# Patient Record
Sex: FEMALE | Race: BLACK OR AFRICAN AMERICAN | NOT HISPANIC OR LATINO | Employment: UNEMPLOYED | URBAN - METROPOLITAN AREA
[De-identification: names, ages, dates, MRNs, and addresses within clinical notes are randomized per-mention and may not be internally consistent; named-entity substitution may affect disease eponyms.]

---

## 2023-11-06 ENCOUNTER — HOSPITAL ENCOUNTER (EMERGENCY)
Facility: HOSPITAL | Age: 28
Discharge: HOME | End: 2023-11-06
Payer: MEDICARE

## 2023-11-06 ENCOUNTER — APPOINTMENT (OUTPATIENT)
Dept: RADIOLOGY | Facility: HOSPITAL | Age: 28
End: 2023-11-06
Payer: MEDICARE

## 2023-11-06 ENCOUNTER — PHARMACY VISIT (OUTPATIENT)
Dept: PHARMACY | Facility: CLINIC | Age: 28
End: 2023-11-06
Payer: COMMERCIAL

## 2023-11-06 VITALS
OXYGEN SATURATION: 99 % | RESPIRATION RATE: 17 BRPM | HEART RATE: 100 BPM | HEIGHT: 59 IN | BODY MASS INDEX: 22.18 KG/M2 | WEIGHT: 110 LBS | DIASTOLIC BLOOD PRESSURE: 77 MMHG | TEMPERATURE: 98.4 F | SYSTOLIC BLOOD PRESSURE: 119 MMHG

## 2023-11-06 DIAGNOSIS — M25.512 ACUTE PAIN OF LEFT SHOULDER: Primary | ICD-10-CM

## 2023-11-06 PROCEDURE — 2500000004 HC RX 250 GENERAL PHARMACY W/ HCPCS (ALT 636 FOR OP/ED)

## 2023-11-06 PROCEDURE — 99283 EMERGENCY DEPT VISIT LOW MDM: CPT | Mod: 25

## 2023-11-06 PROCEDURE — 99285 EMERGENCY DEPT VISIT HI MDM: CPT | Mod: 25

## 2023-11-06 PROCEDURE — 73030 X-RAY EXAM OF SHOULDER: CPT | Mod: LEFT SIDE | Performed by: RADIOLOGY

## 2023-11-06 PROCEDURE — 73030 X-RAY EXAM OF SHOULDER: CPT | Mod: LT,FR

## 2023-11-06 PROCEDURE — 96372 THER/PROPH/DIAG INJ SC/IM: CPT

## 2023-11-06 RX ORDER — NAPROXEN 500 MG/1
500 TABLET ORAL
Qty: 20 TABLET | Refills: 0 | Status: SHIPPED | OUTPATIENT
Start: 2023-11-06 | End: 2023-11-06 | Stop reason: SDUPTHER

## 2023-11-06 RX ORDER — KETOROLAC TROMETHAMINE 30 MG/ML
15 INJECTION, SOLUTION INTRAMUSCULAR; INTRAVENOUS ONCE
Status: COMPLETED | OUTPATIENT
Start: 2023-11-06 | End: 2023-11-06

## 2023-11-06 RX ORDER — NAPROXEN 500 MG/1
500 TABLET ORAL
Qty: 20 TABLET | Refills: 0 | Status: SHIPPED | OUTPATIENT
Start: 2023-11-06 | End: 2023-11-16

## 2023-11-06 RX ADMIN — KETOROLAC TROMETHAMINE 15 MG: 30 INJECTION, SOLUTION INTRAMUSCULAR at 17:37

## 2023-11-06 ASSESSMENT — PAIN DESCRIPTION - ORIENTATION: ORIENTATION: LEFT

## 2023-11-06 ASSESSMENT — PAIN DESCRIPTION - LOCATION: LOCATION: SHOULDER

## 2023-11-06 ASSESSMENT — PAIN - FUNCTIONAL ASSESSMENT: PAIN_FUNCTIONAL_ASSESSMENT: 0-10

## 2023-11-06 ASSESSMENT — PAIN SCALES - GENERAL: PAINLEVEL_OUTOF10: 7

## 2023-11-06 ASSESSMENT — PAIN DESCRIPTION - DESCRIPTORS: DESCRIPTORS: SHOOTING;TENDER

## 2023-11-06 NOTE — ED TRIAGE NOTES
PT ARRIVED TO TRIAGE FOR LEFT SHOULDER PAIN. PT STS THE PAIN HAS BEEN THERE SINCE YESTERDAY. PT DENIES ANY INJURY TO THE PAIN. PT HAS DIFFICULTIES RAISING LEFT ARM DUE TO PAIN. VSS IN TRIAGE. NO OBVIOUS SIGNS OFR DISTRESS NOTED.

## 2023-11-06 NOTE — ED PROVIDER NOTES
HPI   Chief Complaint   Patient presents with    Shoulder Pain       HPI  HPI: This is a 28 year old female who presents to the ER complaining of left shoulder pain for the past 3 days.  Patient states that the night for pain onset she was lifting and carrying her sisters child which she thinks may have incited the pain, the following day she was getting dressed and she felt a pop in her shoulder, and has had constant pain in the shoulder since then.  She reports she has difficulty flexing her bicep and pain with lifting the arm.  She states that the upper arm feels puffy.  She denies any pain in the chest, axilla, back, or neck.  She denies numbness or tingling of the arm.  Has no chest pain or shortness of breath, no palpitations.  No dizziness or lightheadedness, no nausea or vomiting, no diaphoresis.  No lower extremity edema or pain.  No other complaints or symptoms voiced.    ROS:  General: No decreased responsiveness, no fever, chills  Neuro: no numbness or tingling  ENMT: No nosebleed  Eyes: No discharge or redness  Skel: + left shoulder pain, no neck or back pain  Cardiac: No chest pain   Resp: No shortness of breath  GI: No abdominal pain  Skin: no rash or wounds, no erythema, edema or ecchymosis  Heme: no bleeding or petechiae      PMH: denies  Social History: no smoker, no EtOH, no drugs  Family History: Noncontributory      Physical Exam:  General: Vital signs stable, Pt is alert, no acute distress  Eyes: Conjunctiva normal, EOMs intact  HENMT: Normocephalic, atraumatic.  Moist mucous membranes.   Resp: Respiratory effort is normal, no retractions, no stridor.   CV: Heart is regular rate and rhythm.   Skin: No evidence of trauma, skin is warm and dry. No rashes, lesions or ulcers.  Skel: full range of motion of right upper and bilateral lower extremities. No midline tenderness over the cervical thoracic or lumbar spine.  LUE: +tenderness over the proximal biceps insertion site on the anterior shoulder,  significant pain with biceps flexion, palpable abnormality in proximal biceps when flexed, however may be partially due to poor effort secondary to pain.  Proximal biceps tendon intact.  Pain with shoulder forward flexion, abduction, and external rotation.  There is no significant edema about the shoulder or the entire LUE.  There is no warmth or erythema, no evidence of cellulitis or septic joint.  No wounds, bruising, or bleeding.  No pain over the elbow, forearm, wrist, hand, or digits.  No tenderness over the chest wall, back, neck, or trapezius.  Upper extremities are neurovascularly intact distally, 2+ radial pulses, capillary refill less than 2 seconds, warm and well-perfused, sensation intact and equal throughout the BUE.  Neuro: Normal gait, no motor or sensory changes.  Psych: Alert and oriented ×3, judgment is appropriate, normal mood and affect           [place] Coma Scale Score: 15                  Patient History   Past Medical History:   Diagnosis Date    Personal history of other diseases of the respiratory system     History of asthma     No past surgical history on file.  No family history on file.  Social History     Tobacco Use    Smoking status: Not on file    Smokeless tobacco: Not on file   Substance Use Topics    Alcohol use: Not on file    Drug use: Not on file       Physical Exam   ED Triage Vitals [11/06/23 1408]   Temp Heart Rate Resp BP   36.9 °C (98.4 °F) 100 17 119/77      SpO2 Temp Source Heart Rate Source Patient Position   99 % Oral -- Sitting      BP Location FiO2 (%)     Right arm --       Physical Exam    ED Course & MDM   Diagnoses as of 11/06/23 1714   Acute pain of left shoulder       Medical Decision Making  ED course / MDM     Summary:  Patient presented with left shoulder pain for the past 2 to 3 days.  Felt a pop when she was getting dressed.  Vital signs are stable, patient appears nontoxic.  She has no cardiac symptoms, no chest pain, no shortness of breath or  palpitations, no nausea or diaphoresis, no dizziness lightheadedness, no cardiovascular risk factors, feel this is unlikely related to a cardiac etiology.  On exam, there is tenderness at the proximal biceps insertion, she has significant pain with biceps flexion, there appears to be a palpable difference when flex, though may be in part due to poor effort secondary to pain.  Tenderness with forward flexion, external rotation, and abduction as well.  Neurovascularly intact, warm well perfused, no motor or sensory deficit.  2+ radial pulses equal and getting bilaterally.  There is no edema, warmth, or erythema noted, no evidence of cellulitis or septic joint.  X-ray shows no acute fractures or dislocations. Results and differential were discussed in detail with the patient.  She may have a rotator cuff or bicep strain, however there is concern for a proximal biceps tendon rupture.  Offered to call a formal consult the ED today, patient prefers to be discharged and report to the orthopedic injury clinic first thing tomorrow morning.  This is reasonable as she is well-appearing, and the extremity is neurovascularly intact.  Given a sling and a prescription for naproxen, as well as a dose of Toradol in the ED. Patient was given strict return precautions, understands reasons to return to the ED. Also discussed supportive care instructions. I expressed the importance of outpatient follow up with their PCP. All questions were answered, patient expressed understanding and stated that they would comply.    Patient was advised to follow up with PCP or recommended provider in 2-3 days for another evaluation and exam. I advised patient and family/friend/caregiver/guardian to return or go to closest emergency room immediately if symptoms change, get worse, new symptoms develop prior to follow up. If there is no improvement in symptoms in the next 24 hours they are advised to return for further evaluation and exam. I also  explained the plan and treatment course. Patient and family/friend/caregiver/guardian is in agreement with plan, treatment course, and follow up and states verbally that they will comply.    Impression:  1. See diagnosis    Plan: Homegoing. I discussed the differential, results, and discharge plan with the patient and family/friend/caregiver. I emphasized the importance of follow-up with the physician I referred them to in the timeframe recommended.  I explained reasons for the patient to return to the Emergency Department. They agreed that if they feel their condition is worsening or if they have any other concern they should call 911 immediately for further assistance. We also discussed medications that were prescribed including common side effects and interactions. The patient was advised to abstain from driving, operating heavy machinery, or making significant decisions while taking medications such as opiates and muscle relaxers that may impair this. I gave the patient an opportunity to ask all questions they had and answered all of them accordingly. They understand return precautions and discharge instructions. The patient and family/friend/caregiver expressed understanding verbally and that they would comply.       Disposition: Discharge    This note has been transcribed using voice recognition and may contain grammatical errors, misplaced words, incorrect words, incorrect phrases or other errors.     Procedure  Procedures     Alondra Persaud PA-C  11/06/23 0729

## 2023-11-06 NOTE — DISCHARGE INSTRUCTIONS
Go to the orthopedic injury clinic ASAP tomorrow  Concern you may have torn a muscle or ligament in your shoulder

## 2023-11-07 ENCOUNTER — OFFICE VISIT (OUTPATIENT)
Dept: ORTHOPEDIC SURGERY | Facility: HOSPITAL | Age: 28
End: 2023-11-07
Payer: MEDICARE

## 2023-11-07 ENCOUNTER — PHARMACY VISIT (OUTPATIENT)
Dept: PHARMACY | Facility: CLINIC | Age: 28
End: 2023-11-07
Payer: COMMERCIAL

## 2023-11-07 VITALS — BODY MASS INDEX: 22.78 KG/M2 | HEIGHT: 59 IN | WEIGHT: 113 LBS

## 2023-11-07 DIAGNOSIS — M75.21 BICEPS TENDONITIS, RIGHT: Primary | ICD-10-CM

## 2023-11-07 PROCEDURE — 1036F TOBACCO NON-USER: CPT | Performed by: STUDENT IN AN ORGANIZED HEALTH CARE EDUCATION/TRAINING PROGRAM

## 2023-11-07 PROCEDURE — 99214 OFFICE O/P EST MOD 30 MIN: CPT | Mod: GC | Performed by: STUDENT IN AN ORGANIZED HEALTH CARE EDUCATION/TRAINING PROGRAM

## 2023-11-07 PROCEDURE — 99204 OFFICE O/P NEW MOD 45 MIN: CPT | Performed by: STUDENT IN AN ORGANIZED HEALTH CARE EDUCATION/TRAINING PROGRAM

## 2023-11-07 RX ORDER — MELOXICAM 15 MG/1
15 TABLET ORAL DAILY
Qty: 30 TABLET | Refills: 0 | Status: SHIPPED | OUTPATIENT
Start: 2023-11-07 | End: 2023-11-08

## 2023-11-07 ASSESSMENT — ENCOUNTER SYMPTOMS
FEVER: 0
JOINT SWELLING: 0
FATIGUE: 0
ARTHRALGIAS: 1
BACK PAIN: 0
COLOR CHANGE: 0
NUMBNESS: 0
WEAKNESS: 0
ACTIVITY CHANGE: 0

## 2023-11-07 ASSESSMENT — PAIN SCALES - GENERAL: PAINLEVEL_OUTOF10: 7

## 2023-11-07 ASSESSMENT — PAIN - FUNCTIONAL ASSESSMENT: PAIN_FUNCTIONAL_ASSESSMENT: 0-10

## 2023-11-07 ASSESSMENT — PAIN DESCRIPTION - DESCRIPTORS: DESCRIPTORS: ACHING;THROBBING

## 2023-11-07 NOTE — PROGRESS NOTES
REFERRAL SOURCE: No ref. provider found     CHIEF COMPLAINT: left shoulder pain . - orthopedic injury clinic evaluation     HISTORY OF PRESENT ILLNESS  Laurita Landis is a very pleasant 28 y.o. 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     11/7/23: She started to have pain in her left shoulder that started on November 5, 2023 with no apparent injury or specific movement.  Only thing she can think of was on Friday, November 3, 2023 she was lifting her godchild up out of bed with both arms and was carrying her around for a long time.  Since Sunday the pain is gotten worse and can get up to 8 out of 10 intensity.  She describes it as sharp and throbbing and comes and goes with specific movements including arm up overhead and turning her palm up towards the ceiling.  She tried Tylenol with no relief.  She went to the emergency room got tramadol and naproxen which does help with the pain but not the inflammation that she feels in her shoulder.  Pain radiates down her bicep towards her elbow but denies any numbness or tingling in her arm.  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     MEDS    Current Outpatient Medications:     lidocaine HCL 4 % adhesive patch,medicated, Apply 1 Units topically 2 times a day for 5 days., Disp: 10 patch, Rfl: 0    naproxen (Naprosyn) 500 mg tablet, Take 1 tablet (500 mg) by mouth 2 times a day with meals as needed for pain for up to 10 days, Disp: 20 tablet, Rfl: 0  No current facility-administered medications for this visit.    ALLERGIES  No Known Allergies    PAST MEDICAL HISTORY  Past Medical History:   Diagnosis Date    Personal history of other diseases of the respiratory system     History of asthma       PAST SURGICAL HISTORY  No past surgical history on file.    SOCIAL HISTORY   Social History      Socioeconomic History    Marital status: Single     Spouse name: Not on file    Number of children: Not on file    Years of education: Not on file    Highest education level: Not on file   Occupational History    Not on file   Tobacco Use    Smoking status: Never    Smokeless tobacco: Never   Substance and Sexual Activity    Alcohol use: Yes     Alcohol/week: 1.0 standard drink of alcohol     Types: 1 Glasses of wine per week    Drug use: Yes     Types: Marijuana    Sexual activity: Not on file   Other Topics Concern    Not on file   Social History Narrative    Not on file     Social Determinants of Health     Financial Resource Strain: Not on file   Food Insecurity: Not on file   Transportation Needs: Not on file   Physical Activity: Not on file   Stress: Not on file   Social Connections: Not on file   Intimate Partner Violence: Not on file   Housing Stability: Not on file       FAMILY HISTORY  No family history on file.    REVIEW OF SYSTEMS  Except for those mentioned in the history of present illness, and below, a complete review of systems is negative.     Review of Systems   Constitutional:  Negative for activity change, fatigue and fever.   Musculoskeletal:  Positive for arthralgias (lef shoulder pain). Negative for back pain and joint swelling.   Skin:  Negative for color change and rash.   Neurological:  Negative for weakness and numbness.       VITALS  There were no vitals filed for this visit.    PHYSICAL EXAMINATION   GENERAL:  Awake, alert, and oriented, no apparent distress, pleasant, and cooperative  PSYC: Mood is euthymic, affect is congruent  EAR, NOSE, THROAT:  Normocephalic, atraumatic, moist membranes, anicteric sclera  LUNG: Nonlabored breathing  HEART: No clubbing or cyanosis  SKIN: No increased erythema, warmth, rashes, or concerning skin lesions  NEURO: Sensation is intact in the bilateral upper extremities. Strength is grossly 5 out of 5 throughout the bilateral upper extremities, unless  noted below.  GAIT: Non-antalgic.  MUSCULOSKELETAL: Examination of the left shoulder:  Active ROM is full but feels pain with end range of flexion and abduction  Passive ROM is full.  No scapulohumeral dyskinesis.  No appreciable muscle atrophy.  Tenderness to palpation over the bicipital groove.  Full can/Empty can is negative.  Speed's is positive  Cadena's is negative.  Yergason's is positive  Scarf is negative  Hawkin's is negative.  Neer's is negative.    IMAGING STUDIES:   Radiographs of the left shoulder on November 7, 2023 show no fracture and good alignment of the glenohumeral joint. I personally reviewed and interpreted these images and shared the findings with the patient.     IMPRESSION  #1 left biceps tendonitis    PLAN  The following was discussed with the patient:    Laurita Landis is a very pleasant 28 y.o. 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  Her history and physical exam seem most consistent with left biceps tendonitis.  -We discussed the above diagnosis and discussed that she should be using and moving the left arm to help this improve.  -We discussed that physical therapy is the mainstay of treatment and she is given a referral today.  -She was given prescription for meloxicam 15 mg which she should take daily while on her trip to [place].  -She can continue to take Tylenol while taking the meloxicam, but should avoid NSAIDs.  -Follow up in 6 weeks after she returns from her work trip in [place].    The patient was counseled to remain active, but avoid activities that worsen symptoms. The patient was in agreement with this plan. All questions were answered to the best of my ability.    PATIENT EDUCATION:  Education was discussed at today's appointment. A learning needs assessment was performed.    Primary learner: Laurita Landis  Barriers to learning: None  Preferred  language: English  Learning preferences include: Seeing and doing.  Discussed: Diagnosis and treatment plan.  Demonstrated: Understanding of material discussed.  Patient education materials given: None.  Learner response: Learner demonstrated understanding.    This note was dictated using Dragon speech recognition software and was not corrected for spelling or grammatical errors.     Patient seen and examined with PM&R resident, Dr. Young. History, physical examination, pertinent imaging findings and the plan of care were discussed and I performed the key portions of the history, physical examination, and discussion of the plan of care. I have edited his note and agree with the findings.      Tonya Rooney MD    [facility]   and [facility]

## 2023-11-08 RX ORDER — MELOXICAM 15 MG/1
15 TABLET ORAL DAILY
Qty: 30 TABLET | Refills: 0 | Status: SHIPPED | OUTPATIENT
Start: 2023-11-08 | End: 2023-12-08

## 2023-12-06 PROBLEM — M25.521 RIGHT ELBOW PAIN: Status: ACTIVE | Noted: 2023-12-06

## 2023-12-06 NOTE — PROGRESS NOTES
Physical Therapy  Physical Therapy Orthopedic Evaluation    Patient Name: Laurita Landis  MRN: [id number]  Today's Date: 12/7/2023  Referred By: Nevin  [place] [facility] [place] [facility] [place]0 [place]  Visit Limit: ?  Visit: 1    Time Calculation  Start Time: 0840  Stop Time: 0920  Time Calculation (min): 40 min    Current Problem  1. Right elbow pain  Follow Up In Physical Therapy      2. Biceps tendonitis, right  Referral to Physical Therapy           Precautions: No fall risk       Subjective:   Subjective   Chief Complaint: R elbow pain  Onset: 11/1/23  NO: insidious    Pt reports to session with chief complaints of R shoulder, elbow pain that has been ongoing since beginning of November without incident. She says that symptoms insidiously came on and presented to the ED due to the pain. She mentions lifting her godchild up as a possible cause of symptoms but denies feeling any popping sensation. She also denies any associated numbness, tingling with symptoms but describes symptoms as throbbing. Pt states that she has had pinched nerve prior and symptoms do not feel similar. Pt specifically states that symptoms worsen with overhead reaching, lowering of arms to side and dressing. X rays were unremarkable, and pt was diagnosed with biceps tendonitis. She has not had therapy for this complaint. She lives an active lifestyle and is hoping to return to [facility].     Current Condition: same    PAIN  Intensity (0-10): 8/10 current; 10/10 worst  Location: L shoulder   Description: achy, heavy     Aggravating Factors:  movement  Relieving Factors:  rest, otc meds, rx meds    Relevant Information (PMH & Previous Tests/Imaging): x rays  Previous Interventions/Treatments: n/a    Prior Level of Function (PLOF)  Exercise/Physical Activity: independent  Work/School: independent    Red Flags: Do you have any of the following? No  Fever/chills, unexplained weight changes, dizziness/fainting, unexplained change in bowel or bladder functions,  unexplained malaise or muscle weakness, night pain/sweats, numbness or tingling    Objective:  Objective     Observation: rounded shoulders    Shoulder AROM  Flexion  R: 165 deg  L: 145 deg, P!  Abduction  R: 160 deg  L: 135 deg, P!  Functional ER  R: C2  L: C2  Functional IR  R: L5  L: L5    Elbow AROM  Flexion  R: 135 deg  L: 135 deg  Extension  R: 0 deg  L: 0 deg     UE MMT  Shoulder Flexion  R: 4  L: 4  Shoulder Abduction  R: 4  L: 4, P!  Shoulder ER  R: 4  L: 4, P!  Shoulder IR  R: 4  L: 4, P!  Elbow Flexion  R: 4  L: 4, P!  Elbow Extension  R: 4  L: 4, P!    Special Tests:  Empty Can: (+) L  Full can: (+) L  Lift off: (+) L  Hornblower's: (+) L  Uppercut: (+) L  Speed's: (+) L    Outcome Measures:  QuickDASH: 65.91    EDUCATION: home exercise program, plan of care, activity modifications, pain management, and injury pathology       Goals:  Active       PT Problem       PT Goal 1       Start:  12/07/23    Expected End:  01/21/24       In 3 weeks, pt will rate pain 0-4 on the numeric pain scale to allow for restful nights of sleep.  In 3 weeks, pt will be able to lift 5# overhead without symptoms.   In 3 weeks, pt's shoulder AROM will be +10% improved.         PT Goal 2       Start:  12/07/23    Expected End:  03/06/24       In 6 weeks, pt will be able to perform all workplace activities without symptoms.  In 6 weeks, pt will be independent with HEP.  In 6 weeks, pt's QuickDASH will be +6.             Treatments:   Exercise Sets/Reps    Standing Shoulder Row with Anchored Resistance  10   Standing Shoulder Horizontal Abduction with Resistance  10   Shoulder External Rotation and Scapular Retraction with Resistance 10   Access Code: [email or web address]     Equipment Provided: red, green theraband  HEP Provided:    Access Code: [email or web address]  URL: [email or web address]  Date: 12/07/2023  Prepared by: Jefferson Shannon    Exercises  - Standing Shoulder Row with Anchored Resistance  - 2 x daily - 7 x weekly -  2 sets - 10 reps  - Standing Shoulder Horizontal Abduction with Resistance  - 2 x daily - 7 x weekly - 2 sets - 10 reps  - Shoulder External Rotation and Scapular Retraction with Resistance  - 2 x daily - 7 x weekly - 2 sets - 10 reps    Assessment: Pt is a 29 y/o F with signs and symptoms consistent with the referring diagnosis of R biceps tendinitis and possible rotator cuff pathology. Pt displayed lack of elbow range of motion, gross upper extremity strength as well as flexibility, pain and functional ability deficits. Skilled physical therapy is necessary in order to improve aforementioned impairments to allow for increased participation in functional and recreational activities without limitations. Plan of care will consist of upper extremity stretching and strengthening in order to return to PLOF.     Plan: Continue with shoulder and elbow mobility, stability in order to return to recreational and workplace activities without symptoms.     Planned Interventions include: therapeutic exercise, self-care home management, manual therapy, therapeutic activities, gait training, neuromuscular coordination, aquatic therapy  Frequency: 1x/week  Duration: 6 weeks    Jefferson Shannon PT

## 2023-12-07 ENCOUNTER — EVALUATION (OUTPATIENT)
Dept: PHYSICAL THERAPY | Facility: CLINIC | Age: 28
End: 2023-12-07
Payer: MEDICARE

## 2023-12-07 DIAGNOSIS — M75.21 BICEPS TENDONITIS, RIGHT: ICD-10-CM

## 2023-12-07 DIAGNOSIS — M25.521 RIGHT ELBOW PAIN: Primary | ICD-10-CM

## 2023-12-07 PROCEDURE — 97110 THERAPEUTIC EXERCISES: CPT | Mod: GP

## 2023-12-07 PROCEDURE — 97161 PT EVAL LOW COMPLEX 20 MIN: CPT | Mod: GP

## 2023-12-19 ENCOUNTER — APPOINTMENT (OUTPATIENT)
Dept: ORTHOPEDIC SURGERY | Facility: HOSPITAL | Age: 28
End: 2023-12-19
Payer: MEDICARE

## 2023-12-20 ENCOUNTER — APPOINTMENT (OUTPATIENT)
Dept: PHYSICAL THERAPY | Facility: CLINIC | Age: 28
End: 2023-12-20
Payer: MEDICARE

## 2024-03-12 ENCOUNTER — DOCUMENTATION (OUTPATIENT)
Dept: PHYSICAL THERAPY | Facility: CLINIC | Age: 29
End: 2024-03-12
Payer: MEDICARE

## 2024-03-12 NOTE — PROGRESS NOTES
Physical Therapy    Discharge Summary    Name: Laurita Landis  MRN: [id number]  : 1995  Date: 24    Visit Count: 1  Insurance: [facility]    Discharge Summary: PT    Discharge Information: Date of discharge 3/12/24, Date of last visit 23, Date of evaluation 23, Number of attended visits 1, Referred by Nevin, and Referred for R elbow pain    Discharge Status: unable to assess due to pt not retuning to therapy        Rehab Discharge Reason: Attendance inconsistent and Failed to schedule and/or keep follow-up appointment(s)